# Patient Record
Sex: MALE
[De-identification: names, ages, dates, MRNs, and addresses within clinical notes are randomized per-mention and may not be internally consistent; named-entity substitution may affect disease eponyms.]

---

## 2019-09-17 ENCOUNTER — HOSPITAL ENCOUNTER (EMERGENCY)
Dept: HOSPITAL 47 - EC | Age: 34
Discharge: LEFT BEFORE DISCHARGE | End: 2019-09-17
Payer: MEDICARE

## 2019-09-17 VITALS
HEART RATE: 83 BPM | RESPIRATION RATE: 20 BRPM | DIASTOLIC BLOOD PRESSURE: 82 MMHG | TEMPERATURE: 97.4 F | SYSTOLIC BLOOD PRESSURE: 117 MMHG

## 2019-09-17 DIAGNOSIS — F25.0: ICD-10-CM

## 2019-09-17 DIAGNOSIS — Z79.899: ICD-10-CM

## 2019-09-17 DIAGNOSIS — Z91.14: Primary | ICD-10-CM

## 2019-09-17 DIAGNOSIS — R45.1: ICD-10-CM

## 2019-09-17 DIAGNOSIS — F32.9: ICD-10-CM

## 2019-09-17 DIAGNOSIS — Z53.20: ICD-10-CM

## 2019-09-17 DIAGNOSIS — F17.200: ICD-10-CM

## 2019-09-17 PROCEDURE — 80306 DRUG TEST PRSMV INSTRMNT: CPT

## 2019-09-17 PROCEDURE — 82075 ASSAY OF BREATH ETHANOL: CPT

## 2019-09-17 PROCEDURE — 99284 EMERGENCY DEPT VISIT MOD MDM: CPT

## 2019-09-17 NOTE — ED
Psych HPI





- General


Chief Complaint: Psychiatric Symptoms


Stated Complaint: Mental Health


Time Seen by Provider: 09/17/19 00:41


Source: patient, family


Mode of arrival: ambulatory





- History of Present Illness


Initial Comments: 


Wayne is a 33-year-old gentleman who presents to the emergency department 

today stating that he missed his monthly Abilify injection and feels like she 

noted that.  Patient states he is feeling depressed but denies any suicidal 

thoughts or ideations.





- Related Data


                                Home Medications











 Medication  Instructions  Recorded  Confirmed


 


ARIPiprazole IM SYRINGE [Abilify 400 mg IM QMONTH 09/17/19 09/17/19





Maintena Syringe]   











                                    Allergies











Allergy/AdvReac Type Severity Reaction Status Date / Time


 


No Known Allergies Allergy   Verified 09/17/19 00:15














Review of Systems


ROS Statement: 


Those systems with pertinent positive or pertinent negative responses have been 

documented in the HPI.





ROS Other: All systems not noted in ROS Statement are negative.





Past Medical History


Past Medical History: No Reported History


History of Any Multi-Drug Resistant Organisms: None Reported


Past Surgical History: No Surgical Hx Reported


Past Psychological History: Depression, Schizoaffective Disorder


Smoking Status: Current every day smoker


Past Alcohol Use History: None Reported


Past Drug Use History: None Reported





General Exam





- General Exam Comments


Initial Comments: 


Physical Exam


GENERAL:


Patient is well-developed and well-nourished. 


Patient is nontoxic and well-hydrated and is in no distress.





HENT:


Normocephalic, Atraumatic. 





EYES:


PERRL, EOMI





PULMONARY:


Unlabored respirations.  No audible rales rhonchi or wheezing was noted.





CARDIOVASCULAR:


There is a regular rate and rhythm without any murmurs gallops or rubs.  





ABDOMEN:


Soft and nontender with normal bowel sounds. 





SKIN:


Skin is clear with no lesions or rashes and otherwise unremarkable.





: 


Deferred





NEUROLOGIC:


Patient is alert and oriented x3.  Moving all extremities spontaneously





MUSCULOSKELETAL:


Normal extremities with adequate strength and full range of motion.  No lower 

extremity swelling or edema.  No calf tenderness.  





PSYCHIATRIC:


Normal psychiatric evaluation.





Limitations: no limitations





Course


                                   Vital Signs











  09/17/19





  00:10


 


Temperature 97.4 F L


 


Pulse Rate 83


 


Respiratory 20





Rate 


 


Blood Pressure 117/82


 


O2 Sat by Pulse 98





Oximetry 














Medical Decision Making





- Medical Decision Making


Patient was seen and evaluated history was obtained from patient


This patient has an extensive psychiatric history, he is presenting today 

complaining that he missed his last injection.





Denies suicidal or homicidal ideation


Denies hallucinations


Does not appear to be manic or psychotic





Patient was evaluated by EPS nurse, at this time there is no indication for 

petition that she would like the patient to remain here to be evaluated by [facility] 

to discuss follow-up





 became agitated that he was here for 4 hours did not get his injections.  

Patient is not agreeable to staying.  At this time the patient is not petitioned

the we did encourage him to stay the patient refused further evaluation 

including discharge vital signs and walked out of the emergency department.





- Lab Data


                                   Lab Results











  09/17/19 Range/Units





  00:50 


 


Urine Opiates Screen  Not Detected  (NotDetected)  


 


Ur Oxycodone Screen  Not Detected  (NotDetected)  


 


Urine Methadone Screen  Not Detected  (NotDetected)  


 


Ur Propoxyphene Screen  Not Detected  (NotDetected)  


 


Ur Barbiturates Screen  Not Detected  (NotDetected)  


 


U Tricyclic Antidepress  Not Detected  (NotDetected)  


 


Ur Phencyclidine Scrn  Not Detected  (NotDetected)  


 


Ur Amphetamines Screen  Not Detected  (NotDetected)  


 


U Methamphetamines Scrn  Not Detected  (NotDetected)  


 


U Benzodiazepines Scrn  Not Detected  (NotDetected)  


 


Urine Cocaine Screen  Detected H  (NotDetected)  


 


U Marijuana (THC) Screen  Not Detected  (NotDetected)  














Disposition


Clinical Impression: 


 Noncompliance with medication regimen





Disposition: Left Against Medical Advice


Condition: Stable


Is patient prescribed a controlled substance at d/c from ED?: No


Referrals: 


None,Stated [Primary Care Provider] - 1-2 days

## 2019-09-19 ENCOUNTER — HOSPITAL ENCOUNTER (EMERGENCY)
Dept: HOSPITAL 47 - EC | Age: 34
Discharge: HOME | End: 2019-09-19
Payer: MEDICARE

## 2019-09-19 VITALS
HEART RATE: 89 BPM | TEMPERATURE: 98.4 F | SYSTOLIC BLOOD PRESSURE: 116 MMHG | RESPIRATION RATE: 18 BRPM | DIASTOLIC BLOOD PRESSURE: 79 MMHG

## 2019-09-19 DIAGNOSIS — F41.9: ICD-10-CM

## 2019-09-19 DIAGNOSIS — F32.9: ICD-10-CM

## 2019-09-19 DIAGNOSIS — Z91.14: Primary | ICD-10-CM

## 2019-09-19 DIAGNOSIS — Z79.899: ICD-10-CM

## 2019-09-19 DIAGNOSIS — F20.9: ICD-10-CM

## 2019-09-19 DIAGNOSIS — F17.200: ICD-10-CM

## 2019-09-19 PROCEDURE — 99284 EMERGENCY DEPT VISIT MOD MDM: CPT

## 2019-09-19 PROCEDURE — 82075 ASSAY OF BREATH ETHANOL: CPT

## 2019-09-19 PROCEDURE — 80306 DRUG TEST PRSMV INSTRMNT: CPT

## 2019-09-19 NOTE — ED
General Adult HPI





- General


Chief complaint: Psychiatric Symptoms


Stated complaint: Mental health


Time Seen by Provider: 09/19/19 18:03


Source: patient


Mode of arrival: ambulatory


Limitations: no limitations





- History of Present Illness


Initial comments: 





Patient is a 33-year-old male presenting to emergency Department with a chief 

complaint of not able to take Abilify.  Patient reports he receives Abilify 

injections every 4 weeks as [facility].  Patient reports he was not able to go to [facility] 

for the past 2 months to obtain the injections.  Patient reports he has 

developed increased anxiety.  Patient denies any suicidal thoughts or ideations.

 Patient reports is a recently he has "not been eating or".  Patient denies any 

abdominal pain nausea vomiting.  Patient denies any headaches chest pain and 

chest tightness or shortness of breath.Patient is requesting to be admitted for 

further psychiatric evaluation.  Patient has no further complaints.  





- Related Data


                                Home Medications











 Medication  Instructions  Recorded  Confirmed


 


ARIPiprazole IM SYRINGE [Abilify 400 mg IM QMONTH 09/17/19 09/17/19





Maintena Syringe]   











                                    Allergies











Allergy/AdvReac Type Severity Reaction Status Date / Time


 


No Known Allergies Allergy   Verified 09/19/19 17:28














Review of Systems


ROS Statement: 


Those systems with pertinent positive or pertinent negative responses have been 

documented in the HPI.





ROS Other: All systems not noted in ROS Statement are negative.





Past Medical History


Past Medical History: No Reported History


History of Any Multi-Drug Resistant Organisms: None Reported


Past Surgical History: No Surgical Hx Reported


Past Psychological History: Depression, Schizoaffective Disorder


Smoking Status: Current every day smoker


Past Alcohol Use History: None Reported


Past Drug Use History: Cocaine





General Exam


Limitations: no limitations


General appearance: alert, in no apparent distress


Head exam: Present: atraumatic, normocephalic, normal inspection


Eye exam: Present: normal appearance, PERRL, EOMI


Pupils: Present: normal accommodation


ENT exam: Present: normal exam, normal oropharynx, mucous membranes moist, TM's 

normal bilaterally, normal external ear exam


Neck exam: Present: normal inspection, full ROM


Respiratory exam: Present: normal lung sounds bilaterally


Cardiovascular Exam: Present: regular rate, normal rhythm, normal heart sounds


GI/Abdominal exam: Present: soft.  Absent: distended, tenderness, guarding


Extremities exam: Present: normal inspection, full ROM


Back exam: Present: normal inspection, full ROM


Neurological exam: Present: alert, oriented X3


Psychiatric exam: Present: normal affect, normal mood, anxious (Mild anxiety).  

Absent: depressed, agitated


Skin exam: Present: warm, intact, normal color





Course


                                   Vital Signs











  09/19/19





  17:13


 


Temperature 98.4 F


 


Pulse Rate 89


 


Respiratory 18





Rate 


 


Blood Pressure 116/79


 


O2 Sat by Pulse 98





Oximetry 














Medical Decision Making





- Medical Decision Making





Patient is a 33-year-old male presenting to emergency Department with a chief 

complaint of not able to take Abilify.  Patient reports that he has not been 

able to take his Abilify for the past 2 months.  Patient does take Abilify 

injections once a month.  Patient is requesting to be admitted because he needs 

a place to stay.  Patient doesn't have any suicidal thoughts or ideations.  

Patient was evaluated by EPS to suggest patient be discharged because patient 

does not fit the criteria for admission.  Patient does have contact with [facility] but

does not want to use their care.  Strict return parameters were thoroughly 

discussed with patient was understanding and agreeable.  Case discussed with 

physician.





- Lab Data


                                   Lab Results











  09/19/19 Range/Units





  17:40 


 


Urine Opiates Screen  Not Detected  (NotDetected)  


 


Ur Oxycodone Screen  Not Detected  (NotDetected)  


 


Urine Methadone Screen  Not Detected  (NotDetected)  


 


Ur Propoxyphene Screen  Not Detected  (NotDetected)  


 


Ur Barbiturates Screen  Not Detected  (NotDetected)  


 


U Tricyclic Antidepress  Not Detected  (NotDetected)  


 


Ur Phencyclidine Scrn  Not Detected  (NotDetected)  


 


Ur Amphetamines Screen  Detected H  (NotDetected)  


 


U Methamphetamines Scrn  Not Detected  (NotDetected)  


 


U Benzodiazepines Scrn  Not Detected  (NotDetected)  


 


Urine Cocaine Screen  Detected H  (NotDetected)  


 


U Marijuana (THC) Screen  Not Detected  (NotDetected)  














Disposition


Clinical Impression: 


 Noncompliance with medication regimen





Disposition: HOME SELF-CARE


Condition: Stable


Instructions (If sedation given, give patient instructions):  Aripiprazole (By 

injection)


Additional Instructions: 


Please take prescribed medication please follow up with [facility].  Please return to 

emergency department is symptoms worsen.


Is patient prescribed a controlled substance at d/c from ED?: No


Referrals: 


None,Stated [Primary Care Provider] - 1-2 days


Time of Disposition: 19:17